# Patient Record
Sex: MALE | URBAN - NONMETROPOLITAN AREA
[De-identification: names, ages, dates, MRNs, and addresses within clinical notes are randomized per-mention and may not be internally consistent; named-entity substitution may affect disease eponyms.]

---

## 2017-10-24 ENCOUNTER — DOCTOR'S OFFICE (OUTPATIENT)
Dept: URBAN - NONMETROPOLITAN AREA CLINIC 1 | Facility: CLINIC | Age: 42
Setting detail: OPHTHALMOLOGY
End: 2017-10-24
Payer: COMMERCIAL

## 2017-10-24 ENCOUNTER — OPTICAL OFFICE (OUTPATIENT)
Dept: URBAN - NONMETROPOLITAN AREA CLINIC 4 | Facility: CLINIC | Age: 42
Setting detail: OPHTHALMOLOGY
End: 2017-10-24
Payer: COMMERCIAL

## 2017-10-24 DIAGNOSIS — H52.4: ICD-10-CM

## 2017-10-24 DIAGNOSIS — Z01.00: ICD-10-CM

## 2017-10-24 PROCEDURE — V2103 SPHEROCYLINDR 4.00D/12-2.00D: HCPCS | Performed by: OPTOMETRIST

## 2017-10-24 PROCEDURE — 92004 COMPRE OPH EXAM NEW PT 1/>: CPT | Performed by: OPTOMETRIST

## 2017-10-24 PROCEDURE — V2020 VISION SVCS FRAMES PURCHASES: HCPCS | Performed by: OPTOMETRIST

## 2017-10-24 ASSESSMENT — REFRACTION_MANIFEST
OS_VA3: 20/
OD_VA2: 20/
OS_VA3: 20/
OU_VA: 20/
OU_VA: 20/
OS_VA1: 20/
OS_VA2: 20/
OD_VA1: 20/
OS_VA1: 20/
OD_VA1: 20/
OD_VA3: 20/
OD_VA3: 20/
OD_VA2: 20/
OS_VA2: 20/

## 2017-10-24 ASSESSMENT — REFRACTION_AUTOREFRACTION
OS_AXIS: 109
OS_CYLINDER: -0.50
OS_SPHERE: +0.75
OD_CYLINDER: 0.00
OD_SPHERE: +0.25
OD_AXIS: 0

## 2017-10-24 ASSESSMENT — REFRACTION_OUTSIDERX
OD_ADD: +1.25
OD_CYLINDER: SPH
OS_CYLINDER: -0.25
OS_VA1: 20/20
OS_SPHERE: +0.25
OU_VA: 20/
OD_VA1: 20/20
OS_VA3: 20/
OD_VA3: 20/
OD_SPHERE: PLANO
OS_VA2: 20/20
OS_ADD: +1.25
OD_VA2: 20/20
OS_AXIS: 110

## 2017-10-24 ASSESSMENT — VISUAL ACUITY
OS_BCVA: 20/20
OD_BCVA: 20/20

## 2017-10-24 ASSESSMENT — REFRACTION_CURRENTRX
OS_OVR_VA: 20/
OD_OVR_VA: 20/
OD_OVR_VA: 20/
OS_OVR_VA: 20/
OS_OVR_VA: 20/
OD_OVR_VA: 20/

## 2017-10-24 ASSESSMENT — CONFRONTATIONAL VISUAL FIELD TEST (CVF)
OS_FINDINGS: FULL
OD_FINDINGS: FULL

## 2017-10-24 ASSESSMENT — SPHEQUIV_DERIVED
OS_SPHEQUIV: 0.5
OD_SPHEQUIV: 0.25

## 2018-07-27 ENCOUNTER — OFFICE VISIT (OUTPATIENT)
Dept: FAMILY MEDICINE CLINIC | Facility: CLINIC | Age: 43
End: 2018-07-27
Payer: COMMERCIAL

## 2018-07-27 VITALS
DIASTOLIC BLOOD PRESSURE: 70 MMHG | RESPIRATION RATE: 15 BRPM | HEIGHT: 68 IN | OXYGEN SATURATION: 98 % | WEIGHT: 193.6 LBS | HEART RATE: 72 BPM | SYSTOLIC BLOOD PRESSURE: 104 MMHG | BODY MASS INDEX: 29.34 KG/M2

## 2018-07-27 DIAGNOSIS — L03.012 CELLULITIS OF FINGER OF LEFT HAND: Primary | ICD-10-CM

## 2018-07-27 PROCEDURE — 1036F TOBACCO NON-USER: CPT | Performed by: FAMILY MEDICINE

## 2018-07-27 PROCEDURE — 99213 OFFICE O/P EST LOW 20 MIN: CPT | Performed by: FAMILY MEDICINE

## 2018-07-27 PROCEDURE — 3008F BODY MASS INDEX DOCD: CPT | Performed by: FAMILY MEDICINE

## 2018-07-27 RX ORDER — CLINDAMYCIN HYDROCHLORIDE 150 MG/1
300 CAPSULE ORAL EVERY 6 HOURS SCHEDULED
Qty: 21 CAPSULE | Refills: 0 | Status: SHIPPED | OUTPATIENT
Start: 2018-07-27 | End: 2018-08-03

## 2018-07-27 NOTE — PROGRESS NOTES
Assessment/Plan:    No problem-specific Assessment & Plan notes found for this encounter  Diagnoses and all orders for this visit:    Cellulitis of finger of left hand  -     clindamycin (CLEOCIN) 150 mg capsule; Take 2 capsules (300 mg total) by mouth every 6 (six) hours for 7 days          Subjective:      Patient ID: Dennis Marc is a 39 y o  male  He ha a painful lesion on his left thumb that has been bothering him for several days  He tried to squeeze it and got nothing out of it  He has no red streaks going down hand or arms  He ha no decrease ROM  He has no constitutional sx's  He denies trauma  The following portions of the patient's history were reviewed and updated as appropriate:   He  has no past medical history on file  He There are no active problems to display for this patient  He  has no past surgical history on file  His family history is not on file  He  reports that he has never smoked  He has never used smokeless tobacco  He reports that he drinks alcohol  He reports that he does not use drugs  Current Outpatient Prescriptions   Medication Sig Dispense Refill    clindamycin (CLEOCIN) 150 mg capsule Take 2 capsules (300 mg total) by mouth every 6 (six) hours for 7 days 21 capsule 0     No current facility-administered medications for this visit  No current outpatient prescriptions on file prior to visit  No current facility-administered medications on file prior to visit  He is allergic to penicillins       Review of Systems   All other systems reviewed and are negative  Objective:      /70 (BP Location: Right arm, Patient Position: Sitting, Cuff Size: Standard)   Pulse 72   Resp 15   Ht 5' 8" (1 727 m)   Wt 87 8 kg (193 lb 9 6 oz)   SpO2 98%   BMI 29 44 kg/m²          Physical Exam   Constitutional: He appears well-developed and well-nourished  Neck: Normal range of motion  Neck supple     Cardiovascular: Normal rate, regular rhythm, normal heart sounds and intact distal pulses  Pulmonary/Chest: Effort normal and breath sounds normal    Skin:   Small pustule left DIP thumb  Nursing note and vitals reviewed

## 2018-08-10 ENCOUNTER — TELEPHONE (OUTPATIENT)
Dept: FAMILY MEDICINE CLINIC | Facility: CLINIC | Age: 43
End: 2018-08-10
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
## 2018-08-10 RX ORDER — DOXYCYCLINE 100 MG/1
100 CAPSULE ORAL 2 TIMES DAILY
Qty: 20 CAPSULE | Refills: 0 | Status: SHIPPED | OUTPATIENT
Start: 2018-08-10 | End: 2018-08-20

## 2020-10-07 ENCOUNTER — TELEPHONE (OUTPATIENT)
Dept: FAMILY MEDICINE CLINIC | Facility: CLINIC | Age: 45
End: 2020-10-07
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
## 2020-10-07 RX ORDER — DOXYCYCLINE HYCLATE 100 MG
100 TABLET ORAL 2 TIMES DAILY
Qty: 28 TABLET | Refills: 0 | Status: SHIPPED | OUTPATIENT
Start: 2020-10-07 | End: 2020-10-21

## 2020-11-20 ENCOUNTER — DOCTOR'S OFFICE (OUTPATIENT)
Dept: URBAN - NONMETROPOLITAN AREA CLINIC 1 | Facility: CLINIC | Age: 45
Setting detail: OPHTHALMOLOGY
End: 2020-11-20
Payer: COMMERCIAL

## 2020-11-20 ENCOUNTER — RX ONLY (RX ONLY)
Age: 45
End: 2020-11-20

## 2020-11-20 ENCOUNTER — OPTICAL OFFICE (OUTPATIENT)
Dept: URBAN - NONMETROPOLITAN AREA CLINIC 4 | Facility: CLINIC | Age: 45
Setting detail: OPHTHALMOLOGY
End: 2020-11-20
Payer: COMMERCIAL

## 2020-11-20 VITALS — HEIGHT: 60 IN

## 2020-11-20 DIAGNOSIS — H52.4: ICD-10-CM

## 2020-11-20 DIAGNOSIS — H52.223: ICD-10-CM

## 2020-11-20 PROCEDURE — V2020 VISION SVCS FRAMES PURCHASES: HCPCS | Performed by: OPTOMETRIST

## 2020-11-20 PROCEDURE — 92014 COMPRE OPH EXAM EST PT 1/>: CPT | Performed by: OPTOMETRIST

## 2020-11-20 PROCEDURE — V2781 PROGRESSIVE LENS PER LENS: HCPCS | Performed by: OPTOMETRIST

## 2020-11-20 ASSESSMENT — CONFRONTATIONAL VISUAL FIELD TEST (CVF)
OS_FINDINGS: FULL
OD_FINDINGS: FULL

## 2020-11-20 ASSESSMENT — TONOMETRY
OS_IOP_MMHG: 14
OD_IOP_MMHG: 14

## 2020-11-30 ASSESSMENT — REFRACTION_AUTOREFRACTION
OS_AXIS: 066
OS_CYLINDER: -1.25
OD_SPHERE: +1.00
OD_CYLINDER: -0.50
OS_SPHERE: +1.25
OD_AXIS: 046

## 2020-11-30 ASSESSMENT — SPHEQUIV_DERIVED
OS_SPHEQUIV: 0.625
OD_SPHEQUIV: 0.375
OS_SPHEQUIV: 0.625
OD_SPHEQUIV: 0.75

## 2020-11-30 ASSESSMENT — REFRACTION_MANIFEST
OS_VA1: 20/20
OD_VA1: 20/20
OD_CYLINDER: -0.25
OD_VA2: 20/20
OS_ADD: +1.50
OD_ADD: +1.50
OS_SPHERE: +0.75
OS_CYLINDER: -0.25
OD_AXIS: 045
OS_AXIS: 110
OD_SPHERE: +0.50
OS_VA2: 20/20

## 2020-11-30 ASSESSMENT — VISUAL ACUITY
OD_BCVA: 20/20
OS_BCVA: 20/20-1

## 2022-08-30 ENCOUNTER — DOCTOR'S OFFICE (OUTPATIENT)
Dept: URBAN - NONMETROPOLITAN AREA CLINIC 1 | Facility: CLINIC | Age: 47
Setting detail: OPHTHALMOLOGY
End: 2022-08-30
Payer: COMMERCIAL

## 2022-08-30 VITALS — HEIGHT: 60 IN

## 2022-08-30 DIAGNOSIS — H52.4: ICD-10-CM

## 2022-08-30 DIAGNOSIS — Z01.00: ICD-10-CM

## 2022-08-30 PROCEDURE — 92014 COMPRE OPH EXAM EST PT 1/>: CPT | Performed by: OPTOMETRIST

## 2022-08-30 ASSESSMENT — REFRACTION_MANIFEST
OS_CYLINDER: -0.25
OD_VA1: 20/20
OS_AXIS: 080
OD_CYLINDER: -0.25
OS_VA1: 20/20
OS_SPHERE: +1.00
OS_ADD: +1.75
OD_ADD: +1.75
OS_VA2: 20/20
OD_SPHERE: +0.75
OD_VA2: 20/20
OD_AXIS: 065

## 2022-08-30 ASSESSMENT — REFRACTION_AUTOREFRACTION
OS_SPHERE: +1.00
OS_AXIS: 078
OS_CYLINDER: -0.50
OD_AXIS: 063
OD_SPHERE: +0.75
OD_CYLINDER: -0.25

## 2022-08-30 ASSESSMENT — REFRACTION_CURRENTRX
OS_CYLINDER: -0.25
OD_ADD: +1.50
OS_OVR_VA: 20/
OD_AXIS: 047
OD_SPHERE: +0.50
OD_VPRISM_DIRECTION: PROGS
OD_CYLINDER: -0.25
OS_AXIS: 108
OS_VPRISM_DIRECTION: PROGS
OD_OVR_VA: 20/
OS_SPHERE: +0.75
OS_ADD: +1.50

## 2022-08-30 ASSESSMENT — TONOMETRY
OS_IOP_MMHG: 14
OD_IOP_MMHG: 14

## 2022-08-30 ASSESSMENT — VISUAL ACUITY
OD_BCVA: 20/15-1
OS_BCVA: 20/15

## 2022-08-30 ASSESSMENT — SPHEQUIV_DERIVED
OD_SPHEQUIV: 0.625
OD_SPHEQUIV: 0.625
OS_SPHEQUIV: 0.875
OS_SPHEQUIV: 0.75

## 2022-08-30 ASSESSMENT — CONFRONTATIONAL VISUAL FIELD TEST (CVF)
OS_FINDINGS: FULL
OD_FINDINGS: FULL

## 2022-09-13 ENCOUNTER — OPTICAL OFFICE (OUTPATIENT)
Dept: URBAN - NONMETROPOLITAN AREA CLINIC 4 | Facility: CLINIC | Age: 47
Setting detail: OPHTHALMOLOGY
End: 2022-09-13
Payer: COMMERCIAL

## 2022-09-13 DIAGNOSIS — H52.4: ICD-10-CM

## 2022-09-13 PROCEDURE — V2781 PROGRESSIVE LENS PER LENS: HCPCS | Performed by: OPTOMETRIST

## 2022-09-13 PROCEDURE — V2784 LENS POLYCARB OR EQUAL: HCPCS | Performed by: OPTOMETRIST

## 2022-09-13 PROCEDURE — V2020 VISION SVCS FRAMES PURCHASES: HCPCS | Performed by: OPTOMETRIST

## 2022-09-13 PROCEDURE — V2750 ANTI-REFLECTIVE COATING: HCPCS | Performed by: OPTOMETRIST

## 2023-09-08 ENCOUNTER — DOCTOR'S OFFICE (OUTPATIENT)
Dept: URBAN - NONMETROPOLITAN AREA CLINIC 1 | Facility: CLINIC | Age: 48
Setting detail: OPHTHALMOLOGY
End: 2023-09-08
Payer: COMMERCIAL

## 2023-09-08 DIAGNOSIS — H52.4: ICD-10-CM

## 2023-09-08 PROCEDURE — 92014 COMPRE OPH EXAM EST PT 1/>: CPT | Performed by: OPTOMETRIST

## 2023-09-08 ASSESSMENT — SPHEQUIV_DERIVED
OD_SPHEQUIV: 0.75
OD_SPHEQUIV: 0.625
OS_SPHEQUIV: 0.875
OS_SPHEQUIV: 1.25

## 2023-09-08 ASSESSMENT — REFRACTION_CURRENTRX
OS_AXIS: 105
OD_SPHERE: +0.75
OD_CYLINDER: -0.25
OS_ADD: +0.50
OS_SPHERE: +1.00
OS_CYLINDER: -0.25
OS_OVR_VA: 20/
OD_ADD: +1.00
OD_AXIS: 059
OD_OVR_VA: 20/
OD_VPRISM_DIRECTION: BF
OS_VPRISM_DIRECTION: BF

## 2023-09-08 ASSESSMENT — REFRACTION_AUTOREFRACTION
OS_CYLINDER: -0.50
OD_AXIS: 130
OS_SPHERE: +1.50
OD_SPHERE: +1.00
OD_CYLINDER: -0.50
OS_AXIS: 071

## 2023-09-08 ASSESSMENT — VISUAL ACUITY
OS_BCVA: 20/20
OD_BCVA: 20/20

## 2023-09-08 ASSESSMENT — CONFRONTATIONAL VISUAL FIELD TEST (CVF)
OS_FINDINGS: FULL
OD_FINDINGS: FULL

## 2023-09-08 ASSESSMENT — REFRACTION_MANIFEST
OD_CYLINDER: -0.25
OS_ADD: +1.75
OS_CYLINDER: -0.25
OD_SPHERE: +0.75
OD_AXIS: 065
OD_VA2: 20/20
OD_ADD: +1.75
OD_VA1: 20/20
OS_VA1: 20/20
OS_SPHERE: +1.00
OS_AXIS: 080
OS_VA2: 20/20

## 2023-09-08 ASSESSMENT — TONOMETRY
OD_IOP_MMHG: 14
OS_IOP_MMHG: 14

## 2023-11-03 ENCOUNTER — OPTICAL OFFICE (OUTPATIENT)
Dept: URBAN - NONMETROPOLITAN AREA CLINIC 4 | Facility: CLINIC | Age: 48
Setting detail: OPHTHALMOLOGY
End: 2023-11-03
Payer: COMMERCIAL

## 2023-11-03 DIAGNOSIS — H52.4: ICD-10-CM

## 2023-11-03 PROCEDURE — V2750 ANTI-REFLECTIVE COATING: HCPCS | Mod: LT | Performed by: OPTOMETRIST

## 2023-11-03 PROCEDURE — V2781 PROGRESSIVE LENS PER LENS: HCPCS | Mod: LT | Performed by: OPTOMETRIST

## 2023-11-03 PROCEDURE — V2750 ANTI-REFLECTIVE COATING: HCPCS | Performed by: OPTOMETRIST

## 2023-11-03 PROCEDURE — V2020 VISION SVCS FRAMES PURCHASES: HCPCS | Performed by: OPTOMETRIST

## 2023-11-03 PROCEDURE — V2784 LENS POLYCARB OR EQUAL: HCPCS | Mod: LT | Performed by: OPTOMETRIST

## 2023-11-03 PROCEDURE — V2781 PROGRESSIVE LENS PER LENS: HCPCS | Performed by: OPTOMETRIST

## 2023-11-03 PROCEDURE — V2784 LENS POLYCARB OR EQUAL: HCPCS | Performed by: OPTOMETRIST

## 2024-09-10 ENCOUNTER — DOCTOR'S OFFICE (OUTPATIENT)
Dept: URBAN - NONMETROPOLITAN AREA CLINIC 1 | Facility: CLINIC | Age: 49
Setting detail: OPHTHALMOLOGY
End: 2024-09-10
Payer: COMMERCIAL

## 2024-09-10 DIAGNOSIS — H52.4: ICD-10-CM

## 2024-09-10 PROCEDURE — 92014 COMPRE OPH EXAM EST PT 1/>: CPT | Performed by: OPTOMETRIST

## 2024-09-10 ASSESSMENT — REFRACTION_AUTOREFRACTION
OD_AXIS: 55
OS_CYLINDER: -0.50
OS_SPHERE: +1.25
OS_AXIS: 85
OD_CYLINDER: -0.25
OD_SPHERE: +0.75

## 2024-09-10 ASSESSMENT — TONOMETRY
OD_IOP_MMHG: 14
OS_IOP_MMHG: 14

## 2024-09-10 ASSESSMENT — VISUAL ACUITY
OD_BCVA: 20/20
OS_BCVA: 20/20

## 2024-09-10 ASSESSMENT — REFRACTION_CURRENTRX
OS_OVR_VA: 20/
OS_AXIS: 121
OD_SPHERE: +1.75
OS_VPRISM_DIRECTION: SV
OD_CYLINDER: -0.50
OD_AXIS: 54
OD_OVR_VA: 20/
OD_VPRISM_DIRECTION: SV
OS_SPHERE: +2.00
OS_CYLINDER: -0.50

## 2024-09-10 ASSESSMENT — REFRACTION_MANIFEST
OS_SPHERE: +1.25
OS_ADD: +2.00
OD_ADD: +2.00
OD_VA2: 20/20
OS_AXIS: 085
OD_VA1: 20/20
OD_SPHERE: +0.75
OS_CYLINDER: -0.50
OD_AXIS: 065
OS_VA2: 20/20
OS_VA1: 20/20
OD_CYLINDER: -0.25

## 2024-09-10 ASSESSMENT — CONFRONTATIONAL VISUAL FIELD TEST (CVF)
OD_FINDINGS: FULL
OS_FINDINGS: FULL